# Patient Record
Sex: MALE | Race: WHITE | URBAN - METROPOLITAN AREA
[De-identification: names, ages, dates, MRNs, and addresses within clinical notes are randomized per-mention and may not be internally consistent; named-entity substitution may affect disease eponyms.]

---

## 2022-06-27 ENCOUNTER — TELEPHONE (OUTPATIENT)
Dept: FAMILY MEDICINE CLINIC | Age: 37
End: 2022-06-27

## 2022-06-27 NOTE — TELEPHONE ENCOUNTER
Just so the patient does not think that we will be prescribing his suboxone. if he is on it as said at the time of his making this appointment.   He needs to know before he gets here that he will need to find another MD to do that but we can take care of his other needs

## 2022-06-27 NOTE — TELEPHONE ENCOUNTER
Pt hung up. I informed him we were not able to prescribe it. Pt was a little irritated and stated when he called he should have been informed of that. Pt hung up and I was unable to confirm if he wanted to keep appt or not.

## 2022-06-27 NOTE — TELEPHONE ENCOUNTER
[place] looking to establish new patient care. Patient also mentioned Suboxone during the call, but did not go into greater detail. The patient was set up with an appt on 9/22 with Kamari Mancera, but I wanted to make the provider aware of the prescription just in case this poses an issue.